# Patient Record
Sex: FEMALE | Race: BLACK OR AFRICAN AMERICAN | HISPANIC OR LATINO | URBAN - METROPOLITAN AREA
[De-identification: names, ages, dates, MRNs, and addresses within clinical notes are randomized per-mention and may not be internally consistent; named-entity substitution may affect disease eponyms.]

---

## 2023-12-10 ENCOUNTER — EMERGENCY (EMERGENCY)
Facility: HOSPITAL | Age: 23
LOS: 1 days | Discharge: HOME | End: 2023-12-10
Admitting: EMERGENCY MEDICINE
Payer: MEDICAID

## 2023-12-10 VITALS
SYSTOLIC BLOOD PRESSURE: 117 MMHG | OXYGEN SATURATION: 100 % | HEART RATE: 83 BPM | DIASTOLIC BLOOD PRESSURE: 80 MMHG | RESPIRATION RATE: 16 BRPM | TEMPERATURE: 99 F

## 2023-12-10 PROCEDURE — 99284 EMERGENCY DEPT VISIT MOD MDM: CPT

## 2023-12-10 RX ADMIN — Medication 1 MILLIGRAM(S): at 18:03

## 2023-12-10 NOTE — ED ADULT NURSE NOTE - OBJECTIVE STATEMENT
pt received to , aox4.  pt brought to ED by her family.  as per rad, pt had "anxiety attack" at the nail salon.  pt was screaming and throwing her arms up.  pt arrives calm and cooperative, following instructions, denies SI/HI/ AVH.  NP at [facility], will monitor pt received to , aox4.  pt brought to ED by her family.  as per rad, pt had "anxiety attack" at the nail salon.  pt was screaming and throwing her arms up.  pt arrives calm and cooperative, following instructions, denies SI/HI/ AVH.  NP at [facility], will monitor

## 2023-12-10 NOTE — ED PROVIDER NOTE - CLINICAL SUMMARY MEDICAL DECISION MAKING FREE TEXT BOX
22 y/o F  Tolerated medication well. Medical evaluation performed. There is no clinical evidence of intoxication or any acute medical problem requiring immediate intervention. D/C home in company of sister.  Recommend following up with PCP

## 2023-12-10 NOTE — ED PROVIDER NOTE - OBJECTIVE STATEMENT
24 y/o F BIBA secondary to  panic attack. Admits to feeling nevous  and anxious while at the . Admits to vaping and consuming alochol last night . Explicitly denies SI/AH/VH/HI. Denies pain, SOB, fever, chills, chest/abdominal  discomfort. Denies falling, punching or kicking any objects. Denies use of alcohol or illicit drugs . No evidence of physical injuries, broken skin or deformities.

## 2023-12-10 NOTE — ED ADULT TRIAGE NOTE - CHIEF COMPLAINT QUOTE
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 Lasting a short time and now resolved. Denies alcohol or drug use today. h/o anxiety but not on any medications daily. BH called for eval.

## 2023-12-10 NOTE — ED PROVIDER NOTE - PATIENT PORTAL LINK FT
You can access the FollowMyHealth Patient Portal offered by [facility] by registering at the following website: [email or web address]. By joining [email or web address]’s FollowMyHealth portal, you will also be able to view your health information using other applications (apps) compatible with our system. You can access the FollowMyHealth Patient Portal offered by [facility] by registering at the following website: [email or web address]. By joining [employer]’s FollowMyHealth portal, you will also be able to view your health information using other applications (apps) compatible with our system.

## 2023-12-10 NOTE — ED ADULT NURSE NOTE - NSFALLUNIVINTERV_ED_ALL_ED
Bed/Stretcher in lowest position, wheels locked, appropriate side rails in place/Call bell, personal items and telephone in reach/Instruct patient to call for assistance before getting out of bed/chair/stretcher/Non-slip footwear applied when patient is off stretcher/[place] to call system/Physically safe environment - no spills, clutter or unnecessary equipment/Purposeful proactive rounding/Room/bathroom lighting operational, light cord in reach Bed/Stretcher in lowest position, wheels locked, appropriate side rails in place/Call bell, personal items and telephone in reach/Instruct patient to call for assistance before getting out of bed/chair/stretcher/Non-slip footwear applied when patient is off stretcher/[place] to call system/Physically safe environment - no spills, clutter or unnecessary equipment/Purposeful proactive rounding/Room/bathroom lighting operational, light cord in reach